# Patient Record
Sex: MALE | Race: WHITE | NOT HISPANIC OR LATINO | Employment: FULL TIME | URBAN - METROPOLITAN AREA
[De-identification: names, ages, dates, MRNs, and addresses within clinical notes are randomized per-mention and may not be internally consistent; named-entity substitution may affect disease eponyms.]

---

## 2020-10-07 ENCOUNTER — OFFICE VISIT (OUTPATIENT)
Dept: FAMILY MEDICINE | Facility: CLINIC | Age: 55
End: 2020-10-07
Payer: COMMERCIAL

## 2020-10-07 VITALS
BODY MASS INDEX: 25.02 KG/M2 | OXYGEN SATURATION: 98 % | HEIGHT: 73 IN | WEIGHT: 188.75 LBS | RESPIRATION RATE: 14 BRPM | DIASTOLIC BLOOD PRESSURE: 85 MMHG | HEART RATE: 74 BPM | TEMPERATURE: 97.2 F | SYSTOLIC BLOOD PRESSURE: 129 MMHG

## 2020-10-07 DIAGNOSIS — Z23 NEED FOR VACCINATION: ICD-10-CM

## 2020-10-07 DIAGNOSIS — M25.562 CHRONIC PAIN OF LEFT KNEE: Primary | ICD-10-CM

## 2020-10-07 DIAGNOSIS — G89.29 CHRONIC PAIN OF LEFT KNEE: Primary | ICD-10-CM

## 2020-10-07 ASSESSMENT — PAIN SCALES - GENERAL: PAINLEVEL: MILD PAIN (2)

## 2020-10-07 ASSESSMENT — MIFFLIN-ST. JEOR: SCORE: 1739.91

## 2020-10-07 NOTE — NURSING NOTE
"55 year old  Chief Complaint   Patient presents with     Knee Pain     left knee pain since April pt is a runner       Blood pressure 129/85, pulse 74, temperature 97.2  F (36.2  C), resp. rate 14, height 1.854 m (6' 0.99\"), weight 85.6 kg (188 lb 12 oz), SpO2 98 %. Body mass index is 24.91 kg/m .  There is no problem list on file for this patient.      Wt Readings from Last 2 Encounters:   10/07/20 85.6 kg (188 lb 12 oz)     BP Readings from Last 3 Encounters:   10/07/20 129/85         No current outpatient medications on file.     No current facility-administered medications for this visit.        Social History     Tobacco Use     Smoking status: Not on file   Substance Use Topics     Alcohol use: Not on file     Drug use: Not on file       Health Maintenance Due   Topic Date Due     PREVENTIVE CARE VISIT  1965     HEPATITIS C SCREENING  1965     ADVANCE CARE PLANNING  1965     COLORECTAL CANCER SCREENING  04/28/1974     HIV SCREENING  04/28/1979     DTAP/TDAP/TD IMMUNIZATION (1 - Tdap) 04/28/1989     LIPID  04/28/1999     ZOSTER IMMUNIZATION (1 of 2) 04/28/2014     PHQ-2  01/01/2020     INFLUENZA VACCINE (1) 09/01/2020       No results found for: PAP      October 7, 2020 1:51 PM  "

## 2020-10-07 NOTE — NURSING NOTE
"Injectable Influenza Immunization Documentation    1.  Has the patient received the information for the injectable influenza vaccine? YES     2. Is the patient 6 months of age or older? YES     3. Does the patient have any of the following contraindications?         Severe allergy to eggs? No     Severe allergic reaction to previous influenza vaccines? No   Severe allergy to latex? No       History of Guillain-[place] syndrome? No     Currently have a temperature greater than 100.4F? No        4.  Severely egg allergic patients should have flu vaccine eligibility assessed by an MD, RN, or pharmacist, and those who received flu vaccine should be observed for 15 min by an MD, RN, Pharmacist, Medical Technician, or member of clinic staff.\": YES    5. Latex-allergic patients should be given latex-free influenza vaccine Yes. Please reference the Vaccine latex table to determine if your clinic s product is latex-containing.       Vaccination given by EMILY Bright        "

## 2020-10-07 NOTE — PROGRESS NOTES
"Mino Kulkarni is a 55 year old male who presents to [facility] today for his first visit. Here to discuss LEFT knee pain since April 2020. Mino is a runner.  No injury. Sees Tish Dozier, PT      Had flu in 2019 and then sciatica in late 2019 affecting RIGHT side. He restarted running in March 2020 and developed pain in LEFT knee. He's able to run, but gets odd sensation in back of his knee and and then he feels swelling. Also, recently noticed some pain along Medial joint line.     Upon questioning, he recalls some aching in the LEFT knee off and on for years.     Having some swelling, sensation of instability but no tamica episodes. Stiff after sitting. No night pain.     Besides running, he enjoys cycling and walking.     May look for us to take over primary care.   Interested in receiving flu vaccine.       Review Of Systems:  Admits to have gained about 5-10 lbs since pandemic started. In fact, last year was almost 20 lbs lighter prior to a half-marathon  Has otherwise been in usual state of health, e.g.   Cardiovascular: negative  Respiratory: No shortness of breath, dyspnea on exertion, cough, or hemoptysis  Gastrointestinal: negative  Genitourinary: negative    Problem list per EMR:  There is no problem list on file for this patient.      No current outpatient medications on file.       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 Lives in [facility].       EXAM    Vitals: /85   Pulse 74   Temp 97.2  F (36.2  C)   Resp 14   Ht 1.854 m (6' 0.99\")   Wt 85.6 kg (188 lb 12 oz)   SpO2 98%   BMI 24.91 kg/m    BMI= Body mass index is 24.91 kg/m .  Physically fit and well appearing.     Gait was normal  LEFT Knee  On observation, the knee appeared with subtle, small effusion. No redness  Tenderness:  Medial joint line  Active ROM:  approx 0-125. Lacks about 5 degs in total from unaffected RIGHT side  Patellar exam: limited mobility. No " apprehension  Ligament exam:  Stable  Kerry's testing:  Equivocal.     Hips had decreased flexion with anterior tilt on L > R. Also, slightly limited RIGHT internal IROM.      ASSESSMENT/PLAN:  56 yo healthy male runner with LEFT knee pain that is likely due to DJD of medial joint.   Anterior pelvic tilt, L > R. May also have some hip DJD.     PLAN:  Discussed issue of weight and knee pain. He'd like to lose about 10 lbs so goal will be to be around 178 lbs.   Discussed supplements of glucosamine (1500 mg/day) and also new info re: Tumeric.   Also, look into other dietary issues, e.g. gluten and/or dairy.     Renewed order for P.T.   Discussed hamstring strengthening. Showed exercise with physioball.     Also, referred for X-ray at [facility].     Give flu vaccine.   Follow up for primary care.     --Ender Andrade MD  [facility], Department of Family Medicine and Community Health

## 2020-11-05 ENCOUNTER — ANCILLARY PROCEDURE (OUTPATIENT)
Dept: GENERAL RADIOLOGY | Facility: CLINIC | Age: 55
End: 2020-11-05
Attending: FAMILY MEDICINE
Payer: COMMERCIAL

## 2020-11-05 DIAGNOSIS — M25.562 CHRONIC PAIN OF LEFT KNEE: ICD-10-CM

## 2020-11-05 DIAGNOSIS — G89.29 CHRONIC PAIN OF LEFT KNEE: ICD-10-CM

## 2020-11-05 PROCEDURE — 73562 X-RAY EXAM OF KNEE 3: CPT | Mod: LT | Performed by: RADIOLOGY

## 2020-12-18 ENCOUNTER — TELEPHONE (OUTPATIENT)
Dept: ORTHOPEDICS | Facility: CLINIC | Age: 55
End: 2020-12-18

## 2020-12-18 NOTE — TELEPHONE ENCOUNTER
"12/21/20  States that he's using Tumeric and Glucosamine. Has not had PT. Feels some improvement. Has started running about 1 time per week. No set backs.     We reviewed his x-rays. He will try to increase running and let me know if things worsen.  --Ender Andrade MD    [facility] Call Center    Phone Message    May a detailed message be left on voicemail: yes     Reason for Call: Other: Patient called stating he hasn't heard back regarding the results of the knee X-ray from 11/5/20. Please follow up with patient to advise. Thank you!      Action Taken: Message routed to:  [facility]: [facility]    Travel Screening: Not Applicable                 I    I phoned Mino, but received voicemail. I don't see that he's on the \"MyChart\". I left a message requesting a call back or an appointment,  --Ender Andrade MD    3 views left knee radiographs 11/5/2020 8:25 AM     History: Chronic pain of left knee; Chronic pain of left knee     Comparison: None     Findings:     AP views of bilateral knees and lateral and patellofemoral views of  the left knee were obtained.      Left:     No acute osseous abnormality.     Enthesopathic change of the patella and tibial tuberosity with  discontinuous fragment along the patella. No associated soft tissue  swelling.     Small-to-moderate knee joint effusion.     No substantial degenerative change.     Prominent enthesopathic change of the posterior proximal tibia,  incompletely visualized.     Right:     No acute osseous abnormality.     No substantial degenerative change.                                                                      IMPRESSION:  1. No acute osseous abnormality.  2. No substantial degenerative change.  3. Enthesopathic change along the patellar tendon attachments.  4. Small-to-moderate knee joint effusion.     I have personally reviewed the examination and initial interpretation  and I agree with the findings.     KATIE " NABIL

## 2021-01-15 ENCOUNTER — HEALTH MAINTENANCE LETTER (OUTPATIENT)
Age: 56
End: 2021-01-15

## 2021-03-09 ENCOUNTER — IMMUNIZATION (OUTPATIENT)
Dept: NURSING | Facility: CLINIC | Age: 56
End: 2021-03-09
Payer: COMMERCIAL

## 2021-03-09 PROCEDURE — 0031A PR COVID VAC JANSSEN AD26 0.5ML: CPT

## 2021-03-09 PROCEDURE — 91303 PR COVID VAC JANSSEN AD26 0.5ML: CPT

## 2021-08-26 ENCOUNTER — OFFICE VISIT (OUTPATIENT)
Dept: FAMILY MEDICINE | Facility: CLINIC | Age: 56
End: 2021-08-26
Payer: COMMERCIAL

## 2021-08-26 VITALS
RESPIRATION RATE: 15 BRPM | BODY MASS INDEX: 23.39 KG/M2 | OXYGEN SATURATION: 98 % | HEIGHT: 73 IN | TEMPERATURE: 97.2 F | HEART RATE: 65 BPM | SYSTOLIC BLOOD PRESSURE: 121 MMHG | DIASTOLIC BLOOD PRESSURE: 74 MMHG | WEIGHT: 176.5 LBS

## 2021-08-26 DIAGNOSIS — N48.9 LESION OF PENIS: Primary | ICD-10-CM

## 2021-08-26 PROBLEM — E78.5 HYPERLIPIDEMIA: Status: ACTIVE | Noted: 2021-04-19

## 2021-08-26 PROBLEM — M54.2 NECK PAIN: Status: ACTIVE | Noted: 2021-08-26

## 2021-08-26 PROBLEM — M75.40 IMPINGEMENT SYNDROME OF SHOULDER REGION: Status: ACTIVE | Noted: 2021-08-26

## 2021-08-26 PROBLEM — M25.562 LEFT KNEE PAIN: Status: ACTIVE | Noted: 2021-04-28

## 2021-08-26 PROBLEM — L57.0 ACTINIC KERATOSIS: Status: ACTIVE | Noted: 2021-04-19

## 2021-08-26 PROBLEM — D12.6 ADENOMATOUS POLYP OF COLON: Status: ACTIVE | Noted: 2021-04-19

## 2021-08-26 PROBLEM — M25.519 SHOULDER PAIN: Status: ACTIVE | Noted: 2021-08-26

## 2021-08-26 PROBLEM — Z85.828 HISTORY OF BASAL CELL CARCINOMA (BCC): Status: ACTIVE | Noted: 2021-04-19

## 2021-08-26 PROCEDURE — 86695 HERPES SIMPLEX TYPE 1 TEST: CPT | Performed by: FAMILY MEDICINE

## 2021-08-26 PROCEDURE — 86696 HERPES SIMPLEX TYPE 2 TEST: CPT | Performed by: FAMILY MEDICINE

## 2021-08-26 RX ORDER — BUDESONIDE 3 MG/1
CAPSULE, COATED PELLETS ORAL
COMMUNITY
Start: 2019-09-26 | End: 2023-08-16

## 2021-08-26 RX ORDER — VALACYCLOVIR HYDROCHLORIDE 1 G/1
1000 TABLET, FILM COATED ORAL 3 TIMES DAILY
Qty: 21 TABLET | Refills: 0 | Status: SHIPPED | OUTPATIENT
Start: 2021-08-26 | End: 2021-10-11

## 2021-08-26 ASSESSMENT — MIFFLIN-ST. JEOR: SCORE: 1671.54

## 2021-08-26 NOTE — NURSING NOTE
"56 year old  Chief Complaint   Patient presents with     Penis/Scrotum Problem     bumps on penis, appeared this morning, would like testing        Blood pressure 121/74, pulse 65, temperature 97.2  F (36.2  C), temperature source Skin, resp. rate 15, height 1.842 m (6' 0.5\"), weight 80.1 kg (176 lb 8 oz), SpO2 98 %. Body mass index is 23.61 kg/m .  There is no problem list on file for this patient.      Wt Readings from Last 2 Encounters:   08/26/21 80.1 kg (176 lb 8 oz)   10/07/20 85.6 kg (188 lb 12 oz)     BP Readings from Last 3 Encounters:   08/26/21 121/74   10/07/20 129/85         Current Outpatient Medications   Medication     budesonide (ENTOCORT EC) 3 MG EC capsule     No current facility-administered medications for this visit.       Social History     Tobacco Use     Smoking status: Never Smoker     Smokeless tobacco: Never Used   Substance Use Topics     Alcohol use: None     Drug use: None       Health Maintenance Due   Topic Date Due     PREVENTIVE CARE VISIT  Never done     ADVANCE CARE PLANNING  Never done     COLORECTAL CANCER SCREENING  Never done     HIV SCREENING  Never done     HEPATITIS C SCREENING  Never done     LIPID  Never done     ZOSTER IMMUNIZATION (2 of 2) 06/16/2021     INFLUENZA VACCINE (1) 09/01/2021       No results found for: PAP      August 26, 2021 12:46 PM    "

## 2021-08-26 NOTE — PROGRESS NOTES
"SUBJECTIVE:   Mino Kulkarni is a 56 year old male who presents to clinic today to discuss the following problem(s).    Concern for HSV  - bumps on his penis starting this morning  - has had them in the past, was advised \"when this happens again, come in so we can do testing\"  - typically, symptoms will present with mild sensation of burning at the site, symptoms will last for typically 2-3 weeks and then resolve spontaneously  - patient recently tested negative for STDs per chart review  - denies fever, chills, body aches  - denies pain with urination or penile discharge     ROS: 10 point ROS neg other than the symptoms noted above in the HPI.      Today's PHQ-2:  PHQ-2 ( 1999 Pfizer) 8/26/2021   Q1: Little interest or pleasure in doing things 0   Q2: Feeling down, depressed or hopeless 0   PHQ-2 Score 0       History reviewed. No pertinent past medical history.  History reviewed. No pertinent surgical history.  History reviewed. No pertinent family history.  Social History     Tobacco Use     Smoking status: Never Smoker     Smokeless tobacco: Never Used   Substance Use Topics     Alcohol use: None     Drug use: None     Social History     Social History Narrative     Not on file       Current Outpatient Medications   Medication     budesonide (ENTOCORT EC) 3 MG EC capsule     No current facility-administered medications for this visit.     I have reviewed the patient's past medical, surgical, family, and social history.     OBJECTIVE:   /74 (BP Location: Right arm, Patient Position: Sitting, Cuff Size: Adult Regular)   Pulse 65   Temp 97.2  F (36.2  C) (Skin)   Resp 15   Ht 1.842 m (6' 0.5\")   Wt 80.1 kg (176 lb 8 oz)   SpO2 98%   BMI 23.61 kg/m      Constitutional: well-appearing, appears stated age  Eyes: conjunctivae without erythema, sclera anicteric.   Cardiac: regular rate and rhythm, normal S1/S2, no murmur/rubs/gallops  Respiratory: lungs clear to auscultation bilaterally, normal work of " breathing, no wheezes/crackles  : clump of 4-6 papular lesions on RIGHT side of shaft of penis. No current drainage or bleeding, no significant surrounding erythema. No drainage from glans.   Skin: no rashes, lesions, or wounds  Psych: affect is full and appropriate, speech is fluent and non-pressured    ASSESSMENT AND PLAN:     Mino was seen today for penis/scrotum problem.    Diagnoses and all orders for this visit:    Lesion of penis  -     Herpes Simplex Virus 1 and 2 IgG; Future  -     valACYclovir (VALTREX) 1000 mg tablet; Take 1 tablet (1,000 mg) by mouth 3 times daily for 7 days  -     Herpes Simplex Virus 1 and 2 IgG    Chief suspicion for HSV flare. Agreed to testing as noted above. Given the onset of symptoms this morning I will start valacyclovir as noted above. Will notify patient of results as available.       Alex Horne MD  [facility]  08/26/2021, 12:51 PM

## 2021-08-27 LAB
HSV1 IGG SERPL QL IA: 0.12 INDEX
HSV1 IGG SERPL QL IA: ABNORMAL
HSV2 IGG SERPL QL IA: 10.5 INDEX
HSV2 IGG SERPL QL IA: ABNORMAL

## 2021-09-05 ENCOUNTER — HEALTH MAINTENANCE LETTER (OUTPATIENT)
Age: 56
End: 2021-09-05

## 2021-10-11 ENCOUNTER — MYC REFILL (OUTPATIENT)
Dept: FAMILY MEDICINE | Facility: CLINIC | Age: 56
End: 2021-10-11

## 2021-10-11 DIAGNOSIS — N48.9 LESION OF PENIS: ICD-10-CM

## 2021-10-11 RX ORDER — VALACYCLOVIR HYDROCHLORIDE 1 G/1
1000 TABLET, FILM COATED ORAL 3 TIMES DAILY
Qty: 21 TABLET | Refills: 0 | Status: SHIPPED | OUTPATIENT
Start: 2021-10-11 | End: 2021-11-23

## 2021-10-11 NOTE — TELEPHONE ENCOUNTER
MyChart correspondence. Med refilled as requested.     Mino was seen today for refill request.    Diagnoses and all orders for this visit:    Lesion of penis  -     valACYclovir (VALTREX) 1000 mg tablet; Take 1 tablet (1,000 mg) by mouth 3 times daily    Alex Horne MD  1:02 PM, October 11, 2021

## 2021-11-23 ENCOUNTER — MYC REFILL (OUTPATIENT)
Dept: FAMILY MEDICINE | Facility: CLINIC | Age: 56
End: 2021-11-23
Payer: COMMERCIAL

## 2021-11-23 DIAGNOSIS — N48.9 LESION OF PENIS: ICD-10-CM

## 2021-11-23 DIAGNOSIS — B00.9 HSV (HERPES SIMPLEX VIRUS) INFECTION: Primary | ICD-10-CM

## 2021-11-23 RX ORDER — VALACYCLOVIR HYDROCHLORIDE 1 G/1
1000 TABLET, FILM COATED ORAL 3 TIMES DAILY
Qty: 21 TABLET | Refills: 0 | Status: SHIPPED | OUTPATIENT
Start: 2021-11-23 | End: 2022-01-31

## 2021-11-23 NOTE — TELEPHONE ENCOUNTER
[facility]     Mino was seen today for refill request.    Diagnoses and all orders for this visit:    HSV (herpes simplex virus) infection  -     valACYclovir (VALTREX) 1000 mg tablet; Take 1 tablet (1,000 mg) by mouth 3 times daily    Lesion of penis  -     valACYclovir (VALTREX) 1000 mg tablet; Take 1 tablet (1,000 mg) by mouth 3 times daily        Alex Horne MD  5:08 PM, November 23, 2021

## 2022-01-31 ENCOUNTER — MYC REFILL (OUTPATIENT)
Dept: FAMILY MEDICINE | Facility: CLINIC | Age: 57
End: 2022-01-31
Payer: COMMERCIAL

## 2022-01-31 DIAGNOSIS — N48.9 LESION OF PENIS: ICD-10-CM

## 2022-01-31 DIAGNOSIS — B00.9 HSV (HERPES SIMPLEX VIRUS) INFECTION: ICD-10-CM

## 2022-02-02 RX ORDER — VALACYCLOVIR HYDROCHLORIDE 1 G/1
1000 TABLET, FILM COATED ORAL 3 TIMES DAILY
Qty: 21 TABLET | Refills: 0 | Status: SHIPPED | OUTPATIENT
Start: 2022-02-02 | End: 2022-03-11

## 2022-02-02 NOTE — TELEPHONE ENCOUNTER
Valacyclovir (Valtrex) 1000 mg    Last Office Visit: 8/26/21  Future [facility] Appointments: None  Medication last refilled: 11/23/21 #21 with 0 refill(s)    Prescription approved per [facility] Refill Protocol.    Disha العلي RN, BSN

## 2022-02-20 ENCOUNTER — HEALTH MAINTENANCE LETTER (OUTPATIENT)
Age: 57
End: 2022-02-20

## 2022-03-11 ENCOUNTER — MYC REFILL (OUTPATIENT)
Dept: FAMILY MEDICINE | Facility: CLINIC | Age: 57
End: 2022-03-11
Payer: COMMERCIAL

## 2022-03-11 DIAGNOSIS — N48.9 LESION OF PENIS: ICD-10-CM

## 2022-03-11 DIAGNOSIS — B00.9 HSV (HERPES SIMPLEX VIRUS) INFECTION: ICD-10-CM

## 2022-03-11 RX ORDER — VALACYCLOVIR HYDROCHLORIDE 1 G/1
1000 TABLET, FILM COATED ORAL 3 TIMES DAILY
Qty: 21 TABLET | Refills: 0 | Status: SHIPPED | OUTPATIENT
Start: 2022-03-11 | End: 2022-04-29

## 2022-03-11 NOTE — TELEPHONE ENCOUNTER
Medication requested: valACYclovir (VALTREX) 1000 mg tablet  Last office visit: 8/26/21  [facility] appointments: none  Medication last refilled: 2/2/22; 21 tab + 0 refills    Prescription approved per [facility] Refill Protocol.    Tobin VELASQUEZ, RN  03/11/22 4:01 PM

## 2022-04-29 ENCOUNTER — MYC REFILL (OUTPATIENT)
Dept: FAMILY MEDICINE | Facility: CLINIC | Age: 57
End: 2022-04-29
Payer: COMMERCIAL

## 2022-04-29 DIAGNOSIS — B00.9 HSV (HERPES SIMPLEX VIRUS) INFECTION: ICD-10-CM

## 2022-04-29 DIAGNOSIS — N48.9 LESION OF PENIS: ICD-10-CM

## 2022-04-29 RX ORDER — VALACYCLOVIR HYDROCHLORIDE 1 G/1
1000 TABLET, FILM COATED ORAL 3 TIMES DAILY
Qty: 21 TABLET | Refills: 0 | Status: SHIPPED | OUTPATIENT
Start: 2022-04-29 | End: 2022-06-23

## 2022-04-29 NOTE — TELEPHONE ENCOUNTER
Valacyclovir (Valtrex) 1000 mg    Last Office Visit: 8/26/21  Future [facility] Appointments: None  Medication last refilled: 3/11/22 #21 with 0 refill(s)    Required labs per protocol:    LAB REF RANGE 4/24/19 4/19/21   Creatinine 0.74-1.35 mg/dL 1.26 1.07     Prescription approved per [facility] Refill Protocol.    JUAN RAMON CouchN, RN, CCM

## 2022-06-23 ENCOUNTER — MYC REFILL (OUTPATIENT)
Dept: FAMILY MEDICINE | Facility: CLINIC | Age: 57
End: 2022-06-23

## 2022-06-23 DIAGNOSIS — N48.9 LESION OF PENIS: ICD-10-CM

## 2022-06-23 DIAGNOSIS — B00.9 HSV (HERPES SIMPLEX VIRUS) INFECTION: ICD-10-CM

## 2022-06-24 RX ORDER — VALACYCLOVIR HYDROCHLORIDE 1 G/1
1000 TABLET, FILM COATED ORAL 3 TIMES DAILY
Qty: 21 TABLET | Refills: 0 | Status: SHIPPED | OUTPATIENT
Start: 2022-06-24 | End: 2022-08-17

## 2022-06-24 NOTE — TELEPHONE ENCOUNTER
Valacyclovir (Valtrex) 1000 mg    Last Office Visit: 8/26/21  [facility] Appointments: None  Medication last refilled: 4/29/22 #21 with 0 refill(s)    Required labs per protocol:    LAB REF RANGE 4/24/19 4/19/21   Creatinine 0.8-1.25 mg/dL 1.26 1.07     Medication is being filled for 1 time refill only due to:  Patient needs labs Creatinine. Patient needs to be seen because due for year follow up in August.     [employer] message sent to patient requesting he call and schedule appointment.    Disha العلي, JUAN RAMONN, RN, CCM

## 2022-08-16 NOTE — PROGRESS NOTES
Assessment & Plan   Problem List Items Addressed This Visit    None     Visit Diagnoses     HSV (herpes simplex virus) infection    -  Primary    Relevant Medications    valACYclovir (VALTREX) 500 MG tablet    Other Relevant Orders    Basic metabolic panel         Changed Rx to reflect HSV recurrent flare dosinmg BID for three days. Discussed possible daily prophylactic dosing but patient defers this for now. Labs as ordered.     24 minutes spent on the date of the encounter doing chart review, history and exam, documentation and further activities as noted.    Alex Horne MD  [facility]    Subjective   Mino is a 57 year old presenting for the following health issues:  Recheck Medication (Valtrex labs)      HPI   HSV  - multiple refills of valacyclovir over the past year  - due for clinic visit and BMP to assess kidney function for any future Valtrex refills  - per chart, patient has been taking 1g TID for 7 days with each outbreak  - not interested is transitioning to daily prophylaxis at this time  - no concern for medication side effects    Review of Systems   Constitutional, HEENT, cardiovascular, pulmonary, gi and gu systems are negative, except as otherwise noted.      Objective    /80 (BP Location: Right arm, Patient Position: Sitting, Cuff Size: Adult Regular)   Pulse 76   Temp 97.9  F (36.6  C) (Skin)   Resp 12   Wt 82.8 kg (182 lb 8 oz)   SpO2 96%   BMI 24.41 kg/m    Body mass index is 24.41 kg/m .  Physical Exam   GENERAL: healthy, alert and no distress  NECK: no adenopathy, no asymmetry, masses, or scars and thyroid normal to palpation  RESP: lungs clear to auscultation - no rales, rhonchi or wheezes  CV: regular rate and rhythm, normal S1 S2, no S3 or S4, no murmur, click or rub, no peripheral edema and peripheral pulses strong  ABDOMEN: soft, nontender, no hepatosplenomegaly, no masses and bowel sounds normal  MS: no gross musculoskeletal defects noted, no  edema          .  ..

## 2022-08-17 ENCOUNTER — OFFICE VISIT (OUTPATIENT)
Dept: FAMILY MEDICINE | Facility: CLINIC | Age: 57
End: 2022-08-17
Payer: COMMERCIAL

## 2022-08-17 VITALS
HEART RATE: 76 BPM | OXYGEN SATURATION: 96 % | BODY MASS INDEX: 24.41 KG/M2 | TEMPERATURE: 97.9 F | SYSTOLIC BLOOD PRESSURE: 128 MMHG | RESPIRATION RATE: 12 BRPM | WEIGHT: 182.5 LBS | DIASTOLIC BLOOD PRESSURE: 80 MMHG

## 2022-08-17 DIAGNOSIS — B00.9 HSV (HERPES SIMPLEX VIRUS) INFECTION: Primary | ICD-10-CM

## 2022-08-17 PROBLEM — S83.249A TEAR OF MEDIAL MENISCUS OF KNEE: Status: ACTIVE | Noted: 2021-11-24

## 2022-08-17 PROBLEM — M17.9 OSTEOARTHRITIS OF KNEE: Status: ACTIVE | Noted: 2021-11-24

## 2022-08-17 LAB
ANION GAP SERPL CALCULATED.3IONS-SCNC: 11 MMOL/L (ref 7–15)
BUN SERPL-MCNC: 19.5 MG/DL (ref 6–20)
CALCIUM SERPL-MCNC: 9.9 MG/DL (ref 8.6–10)
CHLORIDE SERPL-SCNC: 101 MMOL/L (ref 98–107)
CREAT SERPL-MCNC: 1.1 MG/DL (ref 0.67–1.17)
DEPRECATED HCO3 PLAS-SCNC: 26 MMOL/L (ref 22–29)
GFR SERPL CREATININE-BSD FRML MDRD: 78 ML/MIN/1.73M2
GLUCOSE SERPL-MCNC: 93 MG/DL (ref 70–99)
POTASSIUM SERPL-SCNC: 4 MMOL/L (ref 3.4–5.3)
SODIUM SERPL-SCNC: 138 MMOL/L (ref 136–145)

## 2022-08-17 PROCEDURE — 80048 BASIC METABOLIC PNL TOTAL CA: CPT | Performed by: FAMILY MEDICINE

## 2022-08-17 RX ORDER — VALACYCLOVIR HYDROCHLORIDE 500 MG/1
500 TABLET, FILM COATED ORAL 2 TIMES DAILY
Qty: 6 TABLET | Refills: 4 | Status: SHIPPED | OUTPATIENT
Start: 2022-08-17 | End: 2023-04-04

## 2022-08-17 NOTE — NURSING NOTE
57 year old  Chief Complaint   Patient presents with     Recheck Medication     Valtrex labs       Blood pressure 128/80, pulse 76, temperature 97.9  F (36.6  C), temperature source Skin, resp. rate 12, weight 82.8 kg (182 lb 8 oz), SpO2 96 %. Body mass index is 24.41 kg/m .  Patient Active Problem List   Diagnosis     Actinic keratosis     Adenomatous polyp of colon     Collagenous colitis     History of basal cell carcinoma (BCC)     Hyperlipidemia     Impingement syndrome of shoulder region     Left knee pain     Migraine variant     Neck pain     Shoulder pain       Wt Readings from Last 2 Encounters:   08/17/22 82.8 kg (182 lb 8 oz)   08/26/21 80.1 kg (176 lb 8 oz)     BP Readings from Last 3 Encounters:   08/17/22 128/80   08/26/21 121/74   10/07/20 129/85         Current Outpatient Medications   Medication     budesonide (ENTOCORT EC) 3 MG EC capsule     valACYclovir (VALTREX) 1000 mg tablet     No current facility-administered medications for this visit.       Social History     Tobacco Use     Smoking status: Never Smoker     Smokeless tobacco: Never Used       Health Maintenance Due   Topic Date Due     PREVENTIVE CARE VISIT  Never done     ADVANCE CARE PLANNING  Never done     COLORECTAL CANCER SCREENING  Never done     HIV SCREENING  Never done     HEPATITIS C SCREENING  Never done     LIPID  Never done       No results found for: PAP      August 17, 2022 9:30 AM

## 2022-10-23 ENCOUNTER — HEALTH MAINTENANCE LETTER (OUTPATIENT)
Age: 57
End: 2022-10-23

## 2023-04-02 ENCOUNTER — HEALTH MAINTENANCE LETTER (OUTPATIENT)
Age: 58
End: 2023-04-02

## 2023-04-03 DIAGNOSIS — B00.9 HSV (HERPES SIMPLEX VIRUS) INFECTION: ICD-10-CM

## 2023-04-04 RX ORDER — VALACYCLOVIR HYDROCHLORIDE 500 MG/1
500 TABLET, FILM COATED ORAL 2 TIMES DAILY
Qty: 6 TABLET | Refills: 4 | Status: SHIPPED | OUTPATIENT
Start: 2023-04-04 | End: 2023-08-16

## 2023-04-04 NOTE — TELEPHONE ENCOUNTER
Medication requested: valACYclovir (VALTREX) 500 MG tablet  Last office visit: 8/17/22  [facility] appointments: none  Medication last refilled: 8/17/22; 6 + 4 refills  Last qualifying labs:   Component      Latest Ref Rng 8/17/2022   Creatinine      0.67 - 1.17 mg/dL 1.10      Prescription approved per [facility] Refill Protocol.    Tobin VELASQUEZ, RN  04/04/23 2:10 PM

## 2023-07-07 ENCOUNTER — OFFICE VISIT (OUTPATIENT)
Dept: URGENT CARE | Facility: URGENT CARE | Age: 58
End: 2023-07-07
Payer: COMMERCIAL

## 2023-07-07 VITALS
TEMPERATURE: 97.9 F | HEART RATE: 67 BPM | SYSTOLIC BLOOD PRESSURE: 140 MMHG | OXYGEN SATURATION: 99 % | DIASTOLIC BLOOD PRESSURE: 88 MMHG

## 2023-07-07 DIAGNOSIS — M54.41 CHRONIC RIGHT-SIDED LOW BACK PAIN WITH RIGHT-SIDED SCIATICA: Primary | ICD-10-CM

## 2023-07-07 DIAGNOSIS — G89.29 CHRONIC RIGHT-SIDED LOW BACK PAIN WITH RIGHT-SIDED SCIATICA: Primary | ICD-10-CM

## 2023-07-07 PROCEDURE — 99203 OFFICE O/P NEW LOW 30 MIN: CPT | Performed by: EMERGENCY MEDICINE

## 2023-07-07 RX ORDER — METHYLPREDNISOLONE 4 MG
TABLET, DOSE PACK ORAL
Qty: 21 TABLET | Refills: 0 | Status: SHIPPED | OUTPATIENT
Start: 2023-07-07 | End: 2023-08-16

## 2023-07-07 NOTE — PROGRESS NOTES
"  Assessment & Plan   Problem List Items Addressed This Visit    None  Visit Diagnoses     Hip pain, right    -  Primary    Relevant Orders    XR Hip Right 2-3 Views    Right-sided low back pain with right-sided sciatica, unspecified chronicity        Relevant Orders    MR Lumbar Spine w/o & w Contrast    Spine  Referral         Concern for recurrent injury at site of previous surgery. Imaging as ordered above. Would not expect significant changes with low back XR at this point as this presents as most likely nerve impingement. Referral to spine clinic for consideration of further treatment options as indicated.     26 minutes spent on the date of the encounter doing chart review, history and exam, documentation and further activities as noted.      Alex Horne MD  [facility]    Subjective   Mino is a 58 year old, presenting for the following health issues:  Back Pain (Pt noticed their sciatica sx worsening in April. They had gone to PT at [place]. During the 4th of July weekend, Pt reports dramatic change and worsening of back pain. They visited Urgent Care and received a prednisone dose pack for the pain. )    HPI   \"discuss sciatica\"  U/C note of 7/7/23  Medical Decision Making:  Mino Kulkarni is a 58 year old male who presents for evaluation of back pain that started after running last week and being in a rental car with a bad seat.  He has a history of sciatica in the same leg in the past. The patient did not sustain any trauma, therefore x-rays are not necessary due to the low likelihood of fracture or subluxation.    No red flag symptoms to suggest ER evaluation with CT and/or MRI is indicated at this point.  There is no clinical evidence or history concerning for cauda equina syndrome, discitis, spinal/epidural space hematoma or epidural abscess or other worrisome etiology. The neurological exam is normal and the patient's symptoms seem consistent with musculoskeletal issues and " "sciatica. No focal neurologic deficits here and he is ambulatory.  The patient will be discharged with pain medications (Medrol dosepak) to use as directed. Ice or heat to the back and stretching exercises. No heavy lifting, bending or twisting. Go to the ER if increasing pain, numbness, weakness, or bowel or bladder dysfunction. Patient was advised to schedule follow-up with their primary doctor within 3 days to re-assess symptoms. Questions answered.  Today  - previous history notable for lumbar discectomy at L5-S1 back in 2003.   - currently reports the steroid is helping \"some\" but symptoms persist  - visible muscle atrophy at RIGHT calf, Mino says this is from before the surgery in 2003    RIGHT hip pain  - fell on his hip some years ago and has noticed an \"indentation\" since then  - no pain or weakness  - has always wondered if this might have something to do with his low back and leg symptoms      Review of Systems   Constitutional, HEENT, cardiovascular, pulmonary, gi and gu systems are negative, except as otherwise noted.      Objective    /87 (BP Location: Right arm, Patient Position: Sitting, Cuff Size: Adult Regular)   Pulse 63   Temp 97.9  F (36.6  C) (Skin)   Resp 15   Ht 1.842 m (6' 0.5\")   Wt 82.6 kg (182 lb)   SpO2 97%   BMI 24.34 kg/m    Body mass index is 24.34 kg/m .  Physical Exam   GENERAL: healthy, alert and no distress  NECK: no adenopathy, no asymmetry, masses, or scars and thyroid normal to palpation  RESP: lungs clear to auscultation - no rales, rhonchi or wheezes  CV: regular rate and rhythm, normal S1 S2, no S3 or S4, no murmur, click or rub, no peripheral edema and peripheral pulses strong  ABDOMEN: soft, nontender, no hepatosplenomegaly, no masses and bowel sounds normal  MS: Lower back  - visible muscle atrophy of RIGHT calf as mentioned above  - no pain to palpation at back  - strength grossly normal bilaterally  - sensation grossly intact          "

## 2023-07-07 NOTE — PROGRESS NOTES
Assessment & Plan     Diagnosis:    ICD-10-CM    1. Chronic right-sided low back pain with right-sided sciatica  M54.41 methylPREDNISolone (MEDROL DOSEPAK) 4 MG tablet therapy pack    G89.29               Medical Decision Making:  Mino Kulkarni is a 58 year old male who presents for evaluation of back pain that started after running last week and being in a rental car with a bad seat.  He has a history of sciatica in the same leg in the past. The patient did not sustain any trauma, therefore x-rays are not necessary due to the low likelihood of fracture or subluxation.      No red flag symptoms to suggest ER evaluation with CT and/or MRI is indicated at this point.  There is no clinical evidence or history concerning for cauda equina syndrome, discitis, spinal/epidural space hematoma or epidural abscess or other worrisome etiology. The neurological exam is normal and the patient's symptoms seem consistent with musculoskeletal issues and sciatica. No focal neurologic deficits here and he is ambulatory.    The patient will be discharged with pain medications to use as directed. Ice or heat to the back and stretching exercises. No heavy lifting, bending or twisting. Go to the ER if increasing pain, numbness, weakness, or bowel or bladder dysfunction. Patient was advised to schedule follow-up with their primary doctor within 3 days to re-assess symptoms. Questions answered.    Lopez Rice PA-C  [facility]    Subjective     Mino Kulkarni is a 58 year old male who presents to clinic today for the following health issues:  Chief Complaint   Patient presents with     Urgent Care     Back Pain     Right lower back/ leg   Pain started a week ago after running        HPI  Patient present with back pain that started in April-- hx of sciatica; worsening over the last week with increased running/exercising and being in an uncomfortable rental car. Pain is described as throbbing and is in the right lower  back; does radiate down the right leg consistent with his prior hx of sciatica. Patient has been walking; notes pain is worse with transitioning from sitting to standing and better with laying down. No numbness or weakness, bowel or bladder incontinence, recent trauma, fever, night sweats, abdominal pain or other concerns. Patient has history of spine/back surgery at L5-S1.       Review of Systems    See HPI    Objective      Vitals: BP (!) 140/88   Pulse 67   Temp 97.9  F (36.6  C) (Temporal)   SpO2 99%       Patient Vitals for the past 24 hrs:   BP Temp Temp src Pulse SpO2   07/07/23 1517 (!) 140/88 97.9  F (36.6  C) Temporal 67 99 %       Vital signs reviewed by: Lopez Rice PA-C    Physical Exam   Constitutional: Alert and active. No acute distress.   Head: Atraumatic. Normocephalic  GI: Abdomen is soft and non-tender throughout. No CVA tenderness bilaterally.  Musculoskeletal: Tenderness to palpation in the right lumbosacral paraspinal musculature. No midline T or L spine tenderness to palpation. Normal range of motion of the lower extremties.  Neurological: Alert and oriented x3. Strength and sensation is intact and symmetric in the bilateral lower extremities.  Skin: No rash noted on visualized skin on back.       Lopez Rice PA-C, July 7, 2023

## 2023-07-10 ENCOUNTER — OFFICE VISIT (OUTPATIENT)
Dept: FAMILY MEDICINE | Facility: CLINIC | Age: 58
End: 2023-07-10
Payer: COMMERCIAL

## 2023-07-10 VITALS
WEIGHT: 182 LBS | RESPIRATION RATE: 15 BRPM | OXYGEN SATURATION: 97 % | HEIGHT: 73 IN | BODY MASS INDEX: 24.12 KG/M2 | DIASTOLIC BLOOD PRESSURE: 87 MMHG | HEART RATE: 63 BPM | TEMPERATURE: 97.9 F | SYSTOLIC BLOOD PRESSURE: 137 MMHG

## 2023-07-10 DIAGNOSIS — M54.41 RIGHT-SIDED LOW BACK PAIN WITH RIGHT-SIDED SCIATICA, UNSPECIFIED CHRONICITY: ICD-10-CM

## 2023-07-10 DIAGNOSIS — M25.551 HIP PAIN, RIGHT: Primary | ICD-10-CM

## 2023-07-10 RX ORDER — COLESEVELAM 180 1/1
TABLET ORAL
COMMUNITY
Start: 2023-06-30

## 2023-07-10 NOTE — NURSING NOTE
"58 year old  Chief Complaint   Patient presents with     Back Pain     Pt noticed their sciatica sx worsening in April. They had gone to PT at [place]. During the 4th of July weekend, Pt reports dramatic change and worsening of back pain. They visited Urgent Care and received a prednisone dose pack for the pain.        Blood pressure 137/87, pulse 63, temperature 97.9  F (36.6  C), temperature source Skin, resp. rate 15, height 1.842 m (6' 0.5\"), weight 82.6 kg (182 lb), SpO2 97 %. Body mass index is 24.34 kg/m .  Patient Active Problem List   Diagnosis     Actinic keratosis     Adenomatous polyp of colon     Collagenous colitis     History of basal cell carcinoma (BCC)     Hyperlipidemia     Impingement syndrome of shoulder region     Migraine variant     Neck pain     Shoulder pain     Osteoarthritis of knee     Tear of medial meniscus of knee       Wt Readings from Last 2 Encounters:   07/10/23 82.6 kg (182 lb)   08/17/22 82.8 kg (182 lb 8 oz)     BP Readings from Last 3 Encounters:   07/10/23 137/87   07/07/23 (!) 140/88   08/17/22 128/80         Current Outpatient Medications   Medication     colesevelam (WELCHOL) 625 MG tablet     methylPREDNISolone (MEDROL DOSEPAK) 4 MG tablet therapy pack     valACYclovir (VALTREX) 500 MG tablet     budesonide (ENTOCORT EC) 3 MG EC capsule     No current facility-administered medications for this visit.       Social History     Tobacco Use     Smoking status: Never     Smokeless tobacco: Never   Vaping Use     Vaping Use: Never used       Health Maintenance Due   Topic Date Due     YEARLY PREVENTIVE VISIT  Never done     ADVANCE CARE PLANNING  Never done     HEPATITIS B IMMUNIZATION (1 of 3 - 3-dose series) Never done     HIV SCREENING  Never done     HEPATITIS C SCREENING  Never done     LIPID  Never done       No results found for: PAP      July 10, 2023 9:50 AM    "

## 2023-07-13 ENCOUNTER — MYC MEDICAL ADVICE (OUTPATIENT)
Dept: FAMILY MEDICINE | Facility: CLINIC | Age: 58
End: 2023-07-13

## 2023-07-13 DIAGNOSIS — M54.41 RIGHT-SIDED LOW BACK PAIN WITH RIGHT-SIDED SCIATICA, UNSPECIFIED CHRONICITY: Primary | ICD-10-CM

## 2023-07-13 RX ORDER — PREDNISONE 10 MG/1
TABLET ORAL
Qty: 54 TABLET | Refills: 0 | Status: SHIPPED | OUTPATIENT
Start: 2023-07-13 | End: 2023-08-03

## 2023-07-13 NOTE — TELEPHONE ENCOUNTER
Repeat steroid dose with prolonged taper as noted below.    Diagnoses and all orders for this visit:    Right-sided low back pain with right-sided sciatica, unspecified chronicity  -     predniSONE (DELTASONE) 10 MG tablet; Take 4 tablets (40 mg) by mouth daily for 7 days, THEN 3 tablets (30 mg) daily for 5 days, THEN 2 tablets (20 mg) daily for 3 days, THEN 1 tablet (10 mg) daily for 3 days, THEN 0.5 tablets (5 mg) daily for 3 days.      Alex Horne MD  4:01 PM, July 13, 2023

## 2023-07-14 ENCOUNTER — ANCILLARY PROCEDURE (OUTPATIENT)
Dept: MRI IMAGING | Facility: CLINIC | Age: 58
End: 2023-07-14
Attending: FAMILY MEDICINE
Payer: COMMERCIAL

## 2023-07-14 DIAGNOSIS — M54.41 RIGHT-SIDED LOW BACK PAIN WITH RIGHT-SIDED SCIATICA, UNSPECIFIED CHRONICITY: ICD-10-CM

## 2023-07-14 PROCEDURE — 72148 MRI LUMBAR SPINE W/O DYE: CPT | Mod: GC | Performed by: STUDENT IN AN ORGANIZED HEALTH CARE EDUCATION/TRAINING PROGRAM

## 2023-08-13 ASSESSMENT — ENCOUNTER SYMPTOMS
DECREASED APPETITE: 0
ARTHRALGIAS: 0
SPEECH CHANGE: 0
NECK PAIN: 0
NUMBNESS: 1
STIFFNESS: 1
SKIN CHANGES: 0
INCREASED ENERGY: 0
SEIZURES: 0
NIGHT SWEATS: 0
JOINT SWELLING: 0
MUSCLE WEAKNESS: 1
TINGLING: 1
MYALGIAS: 0
WEIGHT GAIN: 0
PARALYSIS: 0
WEIGHT LOSS: 0
WEAKNESS: 1
MUSCLE CRAMPS: 0
DISTURBANCES IN COORDINATION: 0
BACK PAIN: 1
FATIGUE: 0
HALLUCINATIONS: 0
POOR WOUND HEALING: 0
NAIL CHANGES: 0
POLYDIPSIA: 0
MEMORY LOSS: 0
CHILLS: 0
TREMORS: 0
LOSS OF CONSCIOUSNESS: 0
FEVER: 0
HEADACHES: 0
ALTERED TEMPERATURE REGULATION: 0
DIZZINESS: 0
POLYPHAGIA: 0

## 2023-08-14 ENCOUNTER — OFFICE VISIT (OUTPATIENT)
Dept: ANESTHESIOLOGY | Facility: CLINIC | Age: 58
End: 2023-08-14
Attending: FAMILY MEDICINE
Payer: COMMERCIAL

## 2023-08-14 ENCOUNTER — MYC MEDICAL ADVICE (OUTPATIENT)
Dept: FAMILY MEDICINE | Facility: CLINIC | Age: 58
End: 2023-08-14

## 2023-08-14 VITALS
DIASTOLIC BLOOD PRESSURE: 86 MMHG | OXYGEN SATURATION: 97 % | WEIGHT: 182 LBS | HEIGHT: 72 IN | SYSTOLIC BLOOD PRESSURE: 126 MMHG | HEART RATE: 69 BPM | BODY MASS INDEX: 24.65 KG/M2

## 2023-08-14 DIAGNOSIS — M54.41 RIGHT-SIDED LOW BACK PAIN WITH RIGHT-SIDED SCIATICA, UNSPECIFIED CHRONICITY: ICD-10-CM

## 2023-08-14 DIAGNOSIS — M54.41 RIGHT-SIDED LOW BACK PAIN WITH RIGHT-SIDED SCIATICA, UNSPECIFIED CHRONICITY: Primary | ICD-10-CM

## 2023-08-14 DIAGNOSIS — M25.551 HIP PAIN, RIGHT: ICD-10-CM

## 2023-08-14 PROCEDURE — 99204 OFFICE O/P NEW MOD 45 MIN: CPT | Performed by: ANESTHESIOLOGY

## 2023-08-14 ASSESSMENT — ENCOUNTER SYMPTOMS
MEMORY LOSS: 0
LOSS OF CONSCIOUSNESS: 0
HEADACHES: 0
FEVER: 0
POLYDIPSIA: 0
DIZZINESS: 0
CHILLS: 0
WEAKNESS: 1
NECK PAIN: 0
MYALGIAS: 0
WEIGHT LOSS: 0
TREMORS: 0
SEIZURES: 0
BACK PAIN: 1
SPEECH CHANGE: 0
HALLUCINATIONS: 0
TINGLING: 1

## 2023-08-14 ASSESSMENT — PAIN SCALES - GENERAL: PAINLEVEL: SEVERE PAIN (6)

## 2023-08-14 NOTE — NURSING NOTE
RN read through the instructions with the patient for the recommended procedure: Lumbar Epidural Steroid Injection  Patient verbalized understanding to holding appropriate medication per protocol and was agreeable to NPO policy and needing a .    Anticoagulant: None reported.    Recommended Follow Up: Follow up in 2 months.    Yanique Fischer RNCC

## 2023-08-14 NOTE — NURSING NOTE
Patient presents with:  Consult: Consult Lower back right side pain      Severe Pain (6)         What medications are you using for pain? Ibuprofen    (New patients only) Have you been seen by another pain clinic/ provider? yes    (Return Patients only) What refills are you needing today? No    Expectation Not Sure

## 2023-08-14 NOTE — PATIENT INSTRUCTIONS
Procedures:    Call to schedule your procedure: [phone] option #2  Lumbar Epidural Steroid Injection    Your pre-procedure instructions are below, please call our clinic if you have any questions.      Treatment planning:    Recommendations will be written in the providers note for your Primary Care provider (OR other providers in your care team) to review and make changes to your therapies based on their discretion.       Recommended Follow up:      Follow up in 2 months.    To speak with a nurse, schedule/reschedule/cancel a clinic appointment, or request a medication refill call: [phone].    You can also reach us by [email or web address]: [email or web address]      Procedure Information related to COVID-19     Please call [phone] option #2 to schedule, reschedule, or cancel your procedure appointment.   Phones are answered Monday - Friday from 08:00 - 4:30pm.  Leave a voicemail with your name, birth date, and phone number if no one is available to take your call.        You no longer need to test for COVID- 19 prior to your procedure/surgery, unless your physician specifically requests that you test. If you experience COVID symptoms or have tested positive for COVID-19 within 14 days of your scheduled surgery or procedure, please update our office right away and your procedure may have to be postponed.       The procedure center staff will call you several days before the procedure to review important information that you will need to know for the day of the procedure.     Please contact the clinic if you have further questions about this information [phone].        Information related to Scheduling and Pre-Procedure Instructions:    If you must reschedule your procedure more than two times, you must follow up in clinic before rescheduling again.      Preparing for your procedure    CAUTION - FAILURE TO FOLLOW THESE PRE-PROCEDURE INSTRUCTIONS WILL RESULT IN YOUR PROCEDURE BEING  RESCHEDULED.    Your Procedure: Lumbar Epidural Steroid Injection        You must have a  take you home after your procedure. Transportation by taxi or para-transit is okay as long as you have a responsible adult accompany you. You must provide your 's full name and contact number at time of check in.     Fasting Protocol Please have nothing to eat or drink 1 hour prior to arrival.     Medications If you take any medications, DO NOT STOP. Take your medications as usual the day of your procedure with a sip of water AT LEAST 2 HOURS PRIOR TO ARRIVAL.    Antibiotics If you are currently taking antibiotics, you must complete the entire dose 7 days prior to your scheduled procedure. You must be clear of any signs or symptoms of infection. If you begin antibiotics, please contact our clinic for instructions.     Fever, Chills, or Rash If you experience a fever of higher than 100 degrees, chills, rash, or open wounds during the one week before your procedure, please call the clinic to see if you may proceed with your procedure.      Medication Hold List  **Patients under Cardiology/Neurology care should consult their provider prior to the pain procedure to verify pre-procedure medication instructions. The information below contains general guidelines.**      Blood Thinners If you are taking daily ASPIRIN, PLAVIX, OR OTHER BLOOD THINNERS SUCH AS COUMADIN/WARFARIN, we will need your prescribing doctor to sign a release permitting you to stop these medications. Once approved by your prescribing doctor - STOP ALL BLOOD THINNERS BASED ON THE TIME TABLE BELOW PRIOR TO YOUR PROCEDURE. If you have been instructed to stop WARFARIN(COUMADIN), you must have an INR lab drawn the day before your procedure. Your INR must be within normal limits before we can perform your injection. MEDICATIONS CAN BE RESTARTED AFTER YOUR PROCEDURE.    24 HOUR HOLD  Lovenox (enoxaparin)  Agrylin (Anagrelide)    3 DAY HOLD  Xarelto  (rivaroxaban)    5 DAY HOLD  Coumadin (Warfarin)  Brilinta (ticagrelor) 7 DAY HOLD  Anacin, Bufferin, Ecotrin, Excedrin, Aggrenox (Aspirin)  Pradexa (Dabigatran)  Elmiron (Pentosan)  Plavix (Clopidogrel Bisulfate)  Pletal (Cilostazol)    10 DAY HOLD  Effient (Prasugel)    14 DAY HOLD  Ticlid (ticlopidine)        Non-steroidal Anti-inflammatories (NSAIDs) DO NOT TAKE any non-steroidal anti-inflammatory medications (NSAIDs) listed on the table below. MEDICATIONS CAN BE RESTARTED AFTER YOUR PROCEDURE. Celebrex is OK to take and does not need to be discontinued.     Medications to stop:  1 DAY HOLD  Advil, Motrin (Ibuprofen)  Voltaren (Diclofenac)  Toradol (Ketorolac)    3 DAY HOLD  Arthrotec (diciofenac sodium/misoprostol)  Clinoril (Sulindac)  Indocin (Indomethacin)  Lodine (Etodolac)  Vicoprofen (Hydrocodone and Ibuprofen)  Apixaban (Eliquis)    4 DAY HOLD  Mobic (Meloxicam)  Naprosyn (Naproxen)   7 DAY HOLD  Aleve (Naproxen sodium)  Darvon compound (contains aspirin)  Norgesic Forte (contains aspirin)  Oruvall (Ketoprofen)  Percodan (contains aspirin)  Relafen (Nabumetone)  Salsalate  Trilisate  Vitamin E (more than 400 mg per day)  Any medication containing aspirin    14 DAY HOLD  Daypro (Oxaprozin)  Feldene (Piroxicam)            To speak with a nurse, schedule/reschedule/cancel a clinic appointment, or request a medication refill call: [phone]    You can also reach us by [employer]: [email or web address]

## 2023-08-14 NOTE — LETTER
8/14/2023       RE: Mino Kulkarni  [address] [place] [id number]       Dear Colleague,    Thank you for referring your patient, Mino Kulkarni, to the [facility] FOR COM[facility]HENSIVE PAIN [facility]NAGEMENT [facility]. Please see a copy of my visit note below.      Pain Clinic New Patient Consult Note:    Referring Provider: Ozzie   Primary care provider: Alex Horne.    Mino Kulkarni is a 58 year old y.o. old male who presents to the pain clinic with chronic right leg pain    HPI:  Patient Supplied Answers To the  Pain Questionnaire      8/13/2023    10:55 AM    Pain -  Patient Entered Questionnaire/Answers   What number best describes your pain right now:  0 = No pain  to  10 = Worst pain imaginable 6   How would you describe the pain burning    sharp    numbness    throbbing   Which of the following worsen your pain standing    sitting    walking   Which of the following improve or reduce your pain lying down    sitting    medication   What number best describes your average pain for the past week:  0 = No pain  to  10 = Worst pain imaginable 5   What number best describes your LOWEST pain in past 24 hours:  0 = No pain  to  10 = Worst pain imaginable 2   What number best describes your WORST pain in past 24 hours:  0 = No pain  to  10 = Worst pain imaginable 8   When is your pain worst AM    PM   What non-medicine treatments have you already had for your pain physical therapy       Mino is a pleasant 58 year old male with h/o disectomy in early 2000 which was complicated by right calf area muscle atrophy.   Pain in the lower back with occasional tingling in the left posterior thigh. He has consistent pain in the left calf.   Better with sitting down some times. Sometimes he has lay down on the floor.   He is a , the pain affects his ability to work. He was taking calls from the floor due to severe pain. 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   He went through medrol dose pack twice. He felt much better after the steroid, pain was tolerable, but did not lead to complete resolution. He was able to stand the whole night at concert while on oral steroids. Stopping the steroid caused pain to return.     Pain treatments:    Herbal medicines: cbd gummies helps for several hours.   Physical therapy: he is active with biking and running, weight lifting, full body exercise. He is at Ortho rehab specialist for  [place]  Chiropractor: he tried a chiropractor at [facility] once. Somewhat helpful  Pain physician: evaluated for neck pain at [facility] by Dr. Stephens.   At the time, pain in the lower back was manageable. He was able to run a 1/2 marathon.   Surgery: L5-S1 disectomy  2019 he had epidural steroid injection at [facility] spine [place] details of level and approach are not available. The patient states that the injection was not helpful  Biofeedback: none  Acupuncture: none    Tests/Imaging reviewed with the patient:    MRI Lumbar spine 7/14/2023  T12-L1: No spinal canal or neuroforaminal stenosis.     L1-2: No spinal canal or neuroforaminal stenosis.     L2-3: Disc bulge, ligamentum flavum hypertrophy and facet arthropathy.  Mild bilateral neural foraminal stenosis. No spinal canal stenosis     L3-4: Disc bulge and facet arthropathy. There is mild left neural  foraminal stenosis. No spinal canal or right neural foraminal  stenosis.     L4-5: Disc bulge and facet arthropathy. There may be bilateral  superimposed subarticular disc extrusion with effacement of the  lateral recesses and potential impingement of bilateral traversing L5  nerves. Mild bilateral neural foraminal stenosis. No significant  spinal canal stenosis     L5-S1: Disc bulge with superimposed right subarticular extrusion.  There is right lateral recess narrowing, impingement of right  descending S1 and associated thickening of the nerve. There  is  moderate bilateral neural foraminal stenosis.  Trace opposing endplate  edema about the anterior aspect.       Significant Medical History:   No past medical history on file.       Past Surgical History:  No past surgical history on file.       Family History:  No family history on file.       Social History:  Social History     Socioeconomic History    Marital status:      Spouse name: Not on file    Number of children: Not on file    Years of education: Not on file    Highest education level: Not on file   Occupational History    Not on file   Tobacco Use    Smoking status: Never    Smokeless tobacco: Never   Vaping Use    Vaping Use: Never used   Substance and Sexual Activity    Alcohol use: Not on file    Drug use: Not on file    Sexual activity: Not on file   Other Topics Concern    Not on file   Social History Narrative    Not on file     Social Determinants of Health     Financial Resource Strain: Not on file   Food Insecurity: Not on file   Transportation Needs: Not on file   Physical Activity: Not on file   Stress: Not on file   Social Connections: Not on file   Intimate Partner Violence: Not on file   Housing Stability: Not on file     Social History     Social History Narrative    Not on file          Allergies:  Allergies   Allergen Reactions    Animal Dander Itching     Cats       Current Medications:   Current Outpatient Medications   Medication Sig Dispense Refill    colesevelam (WELCHOL) 625 MG tablet       valACYclovir (VALTREX) 500 MG tablet Take 1 tablet (500 mg) by mouth 2 times daily 6 tablet 4    budesonide (ENTOCORT EC) 3 MG EC capsule Take 3 tabs/day (9mg) daily for 6 weeks. Then 2 tabs/day (6mg) for 2 weeks. Then 1 tab/day (3 mg) 2 weeks. (Patient not taking: Reported on 7/10/2023)      methylPREDNISolone (MEDROL DOSEPAK) 4 MG tablet therapy pack Follow Package Directions 21 tablet 0          Current Pain Medications:  Medications related to Pain Management (From now, onward)       None               Blood thinner:    none    Work History:    Current work status: family     Psychosocial History:     History of treatment for behavioral disorder: none  History of suicidal ideation or suicidal attempt: none    Review of Systems:  Review of Systems   Constitutional:  Negative for chills, fever and weight loss.   Musculoskeletal:  Positive for back pain. Negative for myalgias and neck pain.   Skin:  Positive for itching and rash.   Neurological:  Positive for tingling and weakness. Negative for dizziness, tremors, speech change, seizures, loss of consciousness and headaches.   Endo/Heme/Allergies:  Negative for polydipsia.   Psychiatric/Behavioral:  Negative for hallucinations and memory loss.    All other systems reviewed and are negative.    Physical Exam:     Vitals:    08/14/23 1011   BP: 126/86   BP Location: Right arm   Patient Position: Chair   Cuff Size: Adult Large   Pulse: 69   SpO2: 97%   Weight: 82.6 kg (182 lb)   Height: 1.829 m (6')       General Appearance: No distress, seated comfortably  Mood: Euthymic  HE ENT: Non constricted pupils  Respiratory: Non labored breathing  No cervical LN palpable  Skin: No rashes over exposed skin  MS: right calf atrophy, strength bilateral 5/5  Neuro: intact to light touch bilaterally  Gait: non antalgic, ambulates with  out assistance  Able to heal and toe walk    Pain specific exam:    SLR +    Laboratory results:  Recent Labs   Lab Test 08/17/22  0953      POTASSIUM 4.0   CHLORIDE 101   CO2 26   ANIONGAP 11   GLC 93   BUN 19.5   CR 1.10   ANTONIO 9.9       CBC RESULTS: No results for input(s): WBC, RBC, HGB, HCT, MCV, MCH, MCHC, RDW, PLT in the last 84784 hours.      Imaging:       ASSESSMENT AND PLAN:     Encounter Diagnosis:    Lumbo-sacral radiculopathy right L5, S1  Lumbar disc degeneration  Neuropathic pain  Right calf atrophy    Mino Kulkarni is a 58 year old y.o. old male who presents to the pain clinic with right low back  pain with radiation in the right S1 dermatome    I have summarized the patient s past medical history, discussed their clinical findings and the potential differential diagnosis with the patient. Significant past medical history pertinent to the patient s current condition includes L5-S1 disectomy, pain in the S1 dermatome.  The clinical findings reveal SLR positive. Failure of neural-glide exercises.  The differential diagnosis discussed with the patient are listed above. I have discussed anatomy and possible sources of the pain using models and/or pictures (diagrams). I have discussed multi- disciplinary pain management options withthe patient as pertaining to their case as detailed above. The pain management options we discussed included, but were not limited to the recommendations below.  I also discussed with patient the risks, benefits and alternatives to each pain management option.  All of the patient s questions and concerns were answered to the best of my ability.    RECOMMENDATIONS:     1. Medications: No changes. This is a med spine appointment. He can consider neuropathic agents with pcp.     2. Procedure: We are scheduling the patient for L5-S1 epidural steroid injection with fluoroscopy in the procedure suite. Risks/benefits/alternatives were discussed.     I also discussed with the patient that the possible risks involved with interventional treatment included, but are not limited to, no pain control, worsened pain, stroke,seizure, spinal headache, allergic reactions, introduction of infection or bleeding which may lead to emergent spine surgery, nerve damage, paralysis oreven death.  May consider TFESI in the future.     3. Physical therapy: Continue PT exercises.     4. EMG testing and neurosurgery referral can be considered if there is a lack of response to KADI.     Follow up: 4 weeks after LESI.      Answers submitted by the patient for this visit:  Symptoms you have experienced in the last 30  days (Submitted on 8/13/2023)  General Symptoms: Yes  Skin Symptoms: Yes  HENT Symptoms: No  EYE SYMPTOMS: No  HEART SYMPTOMS: No  LUNG SYMPTOMS: No  INTESTINAL SYMPTOMS: No  URINARY SYMPTOMS: No  REPRODUCTIVE SYMPTOMS: No  SKELETAL SYMPTOMS: Yes  BLOOD SYMPTOMS: No  NERVOUS SYSTEM SYMPTOMS: Yes  MENTAL HEALTH SYMPTOMS: No  Please answer the questions below to tell us what conditions you are experiencing: (Submitted on 8/13/2023)  Loss of appetite: No  Weight gain: No  Fatigue: No  Night sweats: No  Increased stress: Yes  Excessive hunger: No  Feeling hot or cold when others believe the temperature is normal: No  Loss of height: No  Post-operative complications: No  Surgical site pain: No  Change in or Loss of Energy: No  Hyperactivity: No  Confusion: No   (Submitted on 8/13/2023)  Changes in hair: No  Changes in moles/birth marks: No  Changes in nails: No  Acne: No  Change in facial hair: No  Warts: No  Non-healing sores: No  Scarring: No  Flaking of skin: Yes  Color changes of hands/feet in cold : No  Sun sensitivity: No  Skin thickening: No  Please answer the questions below to tell us what condition you are experiencing: (Submitted on 8/13/2023)  Swollen joints: No  Joint pain: No  Bone pain: No  Muscle cramps: No  Muscle weakness: Yes  Joint stiffness: Yes  Bone fracture: No  Please answer the questions below to tell us what condition you are experiencing: (Submitted on 8/13/2023)  Trouble with coordination: No  Difficulty walking: Yes  Paralysis: No  Numbness: Yes      Again, thank you for allowing me to participate in the care of your patient.      Sincerely,    Karen Montoya MD

## 2023-08-14 NOTE — PROGRESS NOTES
Pain Clinic New Patient Consult Note:    Referring Provider: Ozzie   Primary care provider: Alex Horne.    Mino Kulkarni is a 58 year old y.o. old male who presents to the pain clinic with chronic right leg pain    HPI:  Patient Supplied Answers To the  Pain Questionnaire      8/13/2023    10:55 AM    Pain -  Patient Entered Questionnaire/Answers   What number best describes your pain right now:  0 = No pain  to  10 = Worst pain imaginable 6   How would you describe the pain burning    sharp    numbness    throbbing   Which of the following worsen your pain standing    sitting    walking   Which of the following improve or reduce your pain lying down    sitting    medication   What number best describes your average pain for the past week:  0 = No pain  to  10 = Worst pain imaginable 5   What number best describes your LOWEST pain in past 24 hours:  0 = No pain  to  10 = Worst pain imaginable 2   What number best describes your WORST pain in past 24 hours:  0 = No pain  to  10 = Worst pain imaginable 8   When is your pain worst AM    PM   What non-medicine treatments have you already had for your pain physical therapy       Mino is a pleasant 58 year old male with h/o disectomy in early 2000 which was complicated by right calf area muscle atrophy.   Pain in the lower back with occasional tingling in the left posterior thigh. He has consistent pain in the left calf.   Better with sitting down some times. Sometimes he has lay down on the floor.   He is a , the pain affects his ability to work. He was taking calls from the floor due to severe pain. 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   He went through medrol dose pack twice. He felt much better after the steroid, pain was tolerable, but did not lead to complete resolution. He was able to stand the whole night at concert while on oral steroids. Stopping the steroid caused pain to return.     Pain treatments:    Herbal medicines:  cbd gummies helps for several hours.   Physical therapy: he is active with biking and running, weight lifting, full body exercise. He is at [facility]  Chiropractor: he tried a chiropractor at [facility] once. Somewhat helpful  Pain physician: evaluated for neck pain at [facility] by Dr. Stephens.   At the time, pain in the lower back was manageable. He was able to run a 1/2 marathon.   Surgery: L5-S1 disectomy  2019 he had epidural steroid injection at [facility] spine [place] details of level and approach are not available. The patient states that the injection was not helpful  Biofeedback: none  Acupuncture: none    Tests/Imaging reviewed with the patient:    MRI Lumbar spine 7/14/2023  T12-L1: No spinal canal or neuroforaminal stenosis.     L1-2: No spinal canal or neuroforaminal stenosis.     L2-3: Disc bulge, ligamentum flavum hypertrophy and facet arthropathy.  Mild bilateral neural foraminal stenosis. No spinal canal stenosis     L3-4: Disc bulge and facet arthropathy. There is mild left neural  foraminal stenosis. No spinal canal or right neural foraminal  stenosis.     L4-5: Disc bulge and facet arthropathy. There may be bilateral  superimposed subarticular disc extrusion with effacement of the  lateral recesses and potential impingement of bilateral traversing L5  nerves. Mild bilateral neural foraminal stenosis. No significant  spinal canal stenosis     L5-S1: Disc bulge with superimposed right subarticular extrusion.  There is right lateral recess narrowing, impingement of right  descending S1 and associated thickening of the nerve. There is  moderate bilateral neural foraminal stenosis.  Trace opposing endplate  edema about the anterior aspect.       Significant Medical History:   No past medical history on file.       Past Surgical History:  No past surgical history on file.       Family History:  No family history on file.       Social History:  Social History      Socioeconomic History    Marital status:      Spouse name: Not on file    Number of children: Not on file    Years of education: Not on file    Highest education level: Not on file   Occupational History    Not on file   Tobacco Use    Smoking status: Never    Smokeless tobacco: Never   Vaping Use    Vaping Use: Never used   Substance and Sexual Activity    Alcohol use: Not on file    Drug use: Not on file    Sexual activity: Not on file   Other Topics Concern    Not on file   Social History Narrative    Not on file     Social Determinants of Health     Financial Resource Strain: Not on file   Food Insecurity: Not on file   Transportation Needs: Not on file   Physical Activity: Not on file   Stress: Not on file   Social Connections: Not on file   Intimate Partner Violence: Not on file   Housing Stability: Not on file     Social History     Social History Narrative    Not on file          Allergies:  Allergies   Allergen Reactions    Animal Dander Itching     Cats       Current Medications:   Current Outpatient Medications   Medication Sig Dispense Refill    colesevelam (WELCHOL) 625 MG tablet       valACYclovir (VALTREX) 500 MG tablet Take 1 tablet (500 mg) by mouth 2 times daily 6 tablet 4    budesonide (ENTOCORT EC) 3 MG EC capsule Take 3 tabs/day (9mg) daily for 6 weeks. Then 2 tabs/day (6mg) for 2 weeks. Then 1 tab/day (3 mg) 2 weeks. (Patient not taking: Reported on 7/10/2023)      methylPREDNISolone (MEDROL DOSEPAK) 4 MG tablet therapy pack Follow Package Directions 21 tablet 0          Current Pain Medications:  Medications related to Pain Management (From now, onward)      None               Blood thinner:    none    Work History:    Current work status: family     Psychosocial History:     History of treatment for behavioral disorder: none  History of suicidal ideation or suicidal attempt: none    Review of Systems:  Review of Systems   Constitutional:  Negative for chills, fever and  weight loss.   Musculoskeletal:  Positive for back pain. Negative for myalgias and neck pain.   Skin:  Positive for itching and rash.   Neurological:  Positive for tingling and weakness. Negative for dizziness, tremors, speech change, seizures, loss of consciousness and headaches.   Endo/Heme/Allergies:  Negative for polydipsia.   Psychiatric/Behavioral:  Negative for hallucinations and memory loss.    All other systems reviewed and are negative.    Physical Exam:     Vitals:    08/14/23 1011   BP: 126/86   BP Location: Right arm   Patient Position: Chair   Cuff Size: Adult Large   Pulse: 69   SpO2: 97%   Weight: 82.6 kg (182 lb)   Height: 1.829 m (6')       General Appearance: No distress, seated comfortably  Mood: Euthymic  HE ENT: Non constricted pupils  Respiratory: Non labored breathing  No cervical LN palpable  Skin: No rashes over exposed skin  MS: right calf atrophy, strength bilateral 5/5  Neuro: intact to light touch bilaterally  Gait: non antalgic, ambulates with  out assistance  Able to heal and toe walk    Pain specific exam:    SLR +    Laboratory results:  Recent Labs   Lab Test 08/17/22  0953      POTASSIUM 4.0   CHLORIDE 101   CO2 26   ANIONGAP 11   GLC 93   BUN 19.5   CR 1.10   ANTONIO 9.9       CBC RESULTS: No results for input(s): WBC, RBC, HGB, HCT, MCV, MCH, MCHC, RDW, PLT in the last 29767 hours.      Imaging:       ASSESSMENT AND PLAN:     Encounter Diagnosis:    Lumbo-sacral radiculopathy right L5, S1  Lumbar disc degeneration  Neuropathic pain  Right calf atrophy    Mino Kulkarni is a 58 year old y.o. old male who presents to the pain clinic with right low back pain with radiation in the right S1 dermatome    I have summarized the patient s past medical history, discussed their clinical findings and the potential differential diagnosis with the patient. Significant past medical history pertinent to the patient s current condition includes L5-S1 disectomy, pain in the S1 dermatome.  The  clinical findings reveal SLR positive. Failure of neural-glide exercises.  The differential diagnosis discussed with the patient are listed above. I have discussed anatomy and possible sources of the pain using models and/or pictures (diagrams). I have discussed multi- disciplinary pain management options withthe patient as pertaining to their case as detailed above. The pain management options we discussed included, but were not limited to the recommendations below.  I also discussed with patient the risks, benefits and alternatives to each pain management option.  All of the patient s questions and concerns were answered to the best of my ability.    RECOMMENDATIONS:     1. Medications: No changes. This is a med spine appointment. He can consider neuropathic agents with pcp.     2. Procedure: We are scheduling the patient for L5-S1 epidural steroid injection with fluoroscopy in the procedure suite. Risks/benefits/alternatives were discussed.     I also discussed with the patient that the possible risks involved with interventional treatment included, but are not limited to, no pain control, worsened pain, stroke,seizure, spinal headache, allergic reactions, introduction of infection or bleeding which may lead to emergent spine surgery, nerve damage, paralysis oreven death.  May consider TFESI in the future.     3. Physical therapy: Continue PT exercises.     4. EMG testing and neurosurgery referral can be considered if there is a lack of response to KAID.     Follow up: 4 weeks after LESI.                                                                                            Answers submitted by the patient for this visit:  Symptoms you have experienced in the last 30 days (Submitted on 8/13/2023)  General Symptoms: Yes  Skin Symptoms: Yes  HENT Symptoms: No  EYE SYMPTOMS: No  HEART SYMPTOMS: No  LUNG SYMPTOMS: No  INTESTINAL SYMPTOMS: No  URINARY SYMPTOMS: No  REPRODUCTIVE SYMPTOMS: No  SKELETAL SYMPTOMS:  Yes  BLOOD SYMPTOMS: No  NERVOUS SYSTEM SYMPTOMS: Yes  MENTAL HEALTH SYMPTOMS: No  Please answer the questions below to tell us what conditions you are experiencing: (Submitted on 8/13/2023)  Loss of appetite: No  Weight gain: No  Fatigue: No  Night sweats: No  Increased stress: Yes  Excessive hunger: No  Feeling hot or cold when others believe the temperature is normal: No  Loss of height: No  Post-operative complications: No  Surgical site pain: No  Change in or Loss of Energy: No  Hyperactivity: No  Confusion: No   (Submitted on 8/13/2023)  Changes in hair: No  Changes in moles/birth marks: No  Changes in nails: No  Acne: No  Change in facial hair: No  Warts: No  Non-healing sores: No  Scarring: No  Flaking of skin: Yes  Color changes of hands/feet in cold : No  Sun sensitivity: No  Skin thickening: No  Please answer the questions below to tell us what condition you are experiencing: (Submitted on 8/13/2023)  Swollen joints: No  Joint pain: No  Bone pain: No  Muscle cramps: No  Muscle weakness: Yes  Joint stiffness: Yes  Bone fracture: No  Please answer the questions below to tell us what condition you are experiencing: (Submitted on 8/13/2023)  Trouble with coordination: No  Difficulty walking: Yes  Paralysis: No  Numbness: Yes

## 2023-08-15 RX ORDER — DULOXETIN HYDROCHLORIDE 30 MG/1
CAPSULE, DELAYED RELEASE ORAL
Qty: 53 CAPSULE | Refills: 0 | Status: SHIPPED | OUTPATIENT
Start: 2023-08-15 | End: 2023-09-14

## 2023-08-15 NOTE — TELEPHONE ENCOUNTER
[facility]. Will monitor for medication safety and efficacy.     Diagnoses and all orders for this visit:    Right-sided low back pain with right-sided sciatica, unspecified chronicity  -     DULoxetine (CYMBALTA) 30 MG capsule; Take 1 capsule (30 mg) by mouth daily for 7 days, THEN 2 capsules (60 mg) daily for 23 days.    Hip pain, right  -     DULoxetine (CYMBALTA) 30 MG capsule; Take 1 capsule (30 mg) by mouth daily for 7 days, THEN 2 capsules (60 mg) daily for 23 days.      Alex Horne MD  5:43 PM, August 15, 2023

## 2023-08-16 ENCOUNTER — OFFICE VISIT (OUTPATIENT)
Dept: FAMILY MEDICINE | Facility: CLINIC | Age: 58
End: 2023-08-16
Payer: COMMERCIAL

## 2023-08-16 VITALS
OXYGEN SATURATION: 97 % | WEIGHT: 182 LBS | HEIGHT: 72 IN | BODY MASS INDEX: 24.65 KG/M2 | TEMPERATURE: 97.9 F | DIASTOLIC BLOOD PRESSURE: 77 MMHG | HEART RATE: 75 BPM | RESPIRATION RATE: 15 BRPM | SYSTOLIC BLOOD PRESSURE: 116 MMHG

## 2023-08-16 DIAGNOSIS — M54.41 RIGHT-SIDED LOW BACK PAIN WITH RIGHT-SIDED SCIATICA, UNSPECIFIED CHRONICITY: ICD-10-CM

## 2023-08-16 DIAGNOSIS — Z01.818 PREOP GENERAL PHYSICAL EXAM: Primary | ICD-10-CM

## 2023-08-16 ASSESSMENT — ANXIETY QUESTIONNAIRES
GAD7 TOTAL SCORE: 0
5. BEING SO RESTLESS THAT IT IS HARD TO SIT STILL: NOT AT ALL
IF YOU CHECKED OFF ANY PROBLEMS ON THIS QUESTIONNAIRE, HOW DIFFICULT HAVE THESE PROBLEMS MADE IT FOR YOU TO DO YOUR WORK, TAKE CARE OF THINGS AT HOME, OR GET ALONG WITH OTHER PEOPLE: NOT DIFFICULT AT ALL
3. WORRYING TOO MUCH ABOUT DIFFERENT THINGS: NOT AT ALL
1. FEELING NERVOUS, ANXIOUS, OR ON EDGE: NOT AT ALL
2. NOT BEING ABLE TO STOP OR CONTROL WORRYING: NOT AT ALL
GAD7 TOTAL SCORE: 0
6. BECOMING EASILY ANNOYED OR IRRITABLE: NOT AT ALL
7. FEELING AFRAID AS IF SOMETHING AWFUL MIGHT HAPPEN: NOT AT ALL

## 2023-08-16 ASSESSMENT — PATIENT HEALTH QUESTIONNAIRE - PHQ9
SUM OF ALL RESPONSES TO PHQ QUESTIONS 1-9: 2
5. POOR APPETITE OR OVEREATING: NOT AT ALL

## 2023-08-16 NOTE — NURSING NOTE
58 year old  Chief Complaint   Patient presents with    Pre-Op Exam     LUMBAR Surgery: right L5-S1 extended lumbar hemilaminectomy and microdiscectomy       Blood pressure 116/77, pulse 75, temperature 97.9  F (36.6  C), temperature source Skin, resp. rate 15, height 1.829 m (6'), weight 82.6 kg (182 lb), SpO2 97 %. Body mass index is 24.68 kg/m .  Patient Active Problem List   Diagnosis    Actinic keratosis    Adenomatous polyp of colon    Collagenous colitis    History of basal cell carcinoma (BCC)    Hyperlipidemia    Impingement syndrome of shoulder region    Migraine variant    Neck pain    Shoulder pain    Osteoarthritis of knee    Tear of medial meniscus of knee       Wt Readings from Last 2 Encounters:   08/16/23 82.6 kg (182 lb)   08/14/23 82.6 kg (182 lb)     BP Readings from Last 3 Encounters:   08/16/23 116/77   08/14/23 126/86   07/10/23 137/87         Current Outpatient Medications   Medication    colesevelam (WELCHOL) 625 MG tablet    DULoxetine (CYMBALTA) 30 MG capsule    budesonide (ENTOCORT EC) 3 MG EC capsule    methylPREDNISolone (MEDROL DOSEPAK) 4 MG tablet therapy pack    valACYclovir (VALTREX) 500 MG tablet     No current facility-administered medications for this visit.       Social History     Tobacco Use    Smoking status: Never    Smokeless tobacco: Never   Vaping Use    Vaping Use: Never used       Health Maintenance Due   Topic Date Due    YEARLY PREVENTIVE VISIT  Never done    ADVANCE CARE PLANNING  Never done    HEPATITIS B IMMUNIZATION (1 of 3 - 3-dose series) Never done    HIV SCREENING  Never done    HEPATITIS C SCREENING  Never done    LIPID  Never done       No results found for: PAP      August 16, 2023 3:33 PM

## 2023-08-16 NOTE — PROGRESS NOTES
"[facility]  [address], [address]  [place] [facility] [address]  Phone: [phone]  Fax: [phone]  Primary Provider: Alex Horne  Pre-op Performing Provider: PARVEEN CARCAMO      PREOPERATIVE EVALUATION:  Today's date: 8/16/2023    Mino Kulkarni is a 58 year old male who presents for a preoperative evaluation.    Surgical Information:  Surgery/Procedure: Right L5-S1 extended lumbar hemilaminectomy and microdiscectomy   Surgery Location: [facility]  Surgeon: Justice Hall   Surgery Date: 08/21/2023  Time of Surgery: 7:30am  Where patient plans to recover: At home with family  Fax number for surgical facility: [phone]      Subjective     HPI related to upcoming procedure: Off and on back pain for 4 years. Had in 2019 and then again starting in April 2023. Now, starting to have weakness.     Alex has a known cardiac murmur for \"many years.\" Has had Echocardiograms that were normal.   Also, has Executive health physicals at [facility] with ECGs that have been normal.         8/15/2023     1:07 PM   Preop Questions   1. Have you ever had a heart attack or stroke? No   2. Have you ever had surgery on your heart or blood vessels, such as a stent placement, a coronary artery bypass, or surgery on an artery in your head, neck, heart, or legs? No   3. Do you have chest pain with activity? No   4. Do you have a history of  heart failure? No   5. Do you currently have a cold, bronchitis or symptoms of other infection? No   6. Do you have a cough, shortness of breath, or wheezing? No   7. Do you or anyone in your family have previous history of blood clots? No   8. Do you or does anyone in your family have a serious bleeding problem such as prolonged bleeding following surgeries or cuts? No   9. Have you ever had problems with anemia or been told to take iron pills? No   10. Have you had any abnormal blood loss such as black, tarry or bloody stools? No "   11. Have you ever had a blood transfusion? No   12. Are you willing to have a blood transfusion if it is medically needed before, during, or after your surgery? Yes   13. Have you or any of your relatives ever had problems with anesthesia? No   14. Do you have sleep apnea, excessive snoring or daytime drowsiness? No   15. Do you have any artifical heart valves or other implanted medical devices like a pacemaker, defibrillator, or continuous glucose monitor? No   16. Do you have artificial joints? No   17. Are you allergic to latex? No       Health Care Directive:  Patient does not have a Health Care Directive or Living Will: Patient states has Advance Directive and will bring in a copy to clinic.    Preoperative Review of :   reviewed - controlled substances prescribed by other outside provider(s).  Received Tramadol for oral procedure in June 2023      Status of Chronic Conditions:  See problem list for active medical problems.  Problems all longstanding and stable, except as noted/documented.  See ROS for pertinent symptoms related to these conditions.    Review of Systems  Constitutional, neuro, ENT, endocrine, pulmonary, cardiac, gastrointestinal, genitourinary, musculoskeletal, integument and psychiatric systems are negative, except as otherwise noted.    Patient Active Problem List    Diagnosis Date Noted    Osteoarthritis of knee 11/24/2021     Priority: Medium    Tear of medial meniscus of knee 11/24/2021     Priority: Medium    Impingement syndrome of shoulder region 08/26/2021     Priority: Medium    Neck pain 08/26/2021     Priority: Medium    Shoulder pain 08/26/2021     Priority: Medium    Actinic keratosis 04/19/2021     Priority: Medium    Adenomatous polyp of colon 04/19/2021     Priority: Medium    History of basal cell carcinoma (BCC) 04/19/2021     Priority: Medium    Hyperlipidemia 04/19/2021     Priority: Medium    Collagenous colitis 05/09/2011     Priority: Medium     Formatting of  this note might be different from the original.  5/2011. Responded quickly to [facility]  Budesonide recommended for future episodes. Dr Cobos, 8/2012      Migraine variant 05/21/2008     Priority: Medium     Formatting of this note might be different from the original.  Normal MR/MRA 5/12 after visual field cut.        No past medical history on file.  No past surgical history on file.  Current Outpatient Medications   Medication Sig Dispense Refill    colesevelam (WELCHOL) 625 MG tablet       DULoxetine (CYMBALTA) 30 MG capsule Take 1 capsule (30 mg) by mouth daily for 7 days, THEN 2 capsules (60 mg) daily for 23 days. 53 capsule 0       Allergies   Allergen Reactions    Animal Dander Itching     Cats        Social History     Tobacco Use    Smoking status: Never    Smokeless tobacco: Never   Substance Use Topics    Alcohol use: Not on file       Objective     /77 (BP Location: Right arm, Patient Position: Sitting, Cuff Size: Adult Regular)   Pulse 75   Temp 97.9  F (36.6  C) (Skin)   Resp 15   Ht 1.829 m (6')   Wt 82.6 kg (182 lb)   SpO2 97%   BMI 24.68 kg/m      Physical Exam    GENERAL APPEARANCE: healthy, alert and no distress     EYES: EOMI,  PERRL     HENT: ear canals and TM's normal and nose and mouth without ulcers or lesions     NECK: no adenopathy, no asymmetry, masses, or scars and thyroid normal to palpation     RESP: lungs clear to auscultation - no rales, rhonchi or wheezes     CV: regular rates and rhythm, normal S1 S2. Has a murmur, systolic ejection at RIGHT sternal border. This has been constant for years and is louder when he's lying than when he's seated. No click or rub     ABDOMEN:  soft, nontender, no HSM or masses and bowel sounds normal     SKIN: no suspicious lesions or rashes  Has RIGHT low back pain with weakness in the RIGHT calf. Can do active straight leg raise.      NEURO: Normal strength and tone, sensory exam grossly normal, mentation intact and speech normal      PSYCH: mentation appears normal. and affect normal/bright     LYMPHATICS: No cervical adenopathy    Recent Labs   Lab Test 08/17/22  0953      POTASSIUM 4.0   CR 1.10        Diagnostics:    Had Echo in April 2023 for evaluation of the cardiac murmur.   Interpretation as follows:   Final Impressions   1. Normal left ventricular chamber size, no regional wall motion abnormalities, calculated 2-D biplane volumetric ejection fraction of 60%.   2. Normal left ventricular geometry, normal diastolic function.   3. Normal right ventricular chamber size, normal systolic function, estimated right ventricular systolic pressure 26 mmHg (right atrial pressure of 5 mmHg).   4. No hemodynamically significant valvular heart disease.   5. No  pericardial effusion.     Also, had an ECG in April 2023 at [facility] with interpretation of normal sinus rhythm    Revised Cardiac Risk Index (RCRI):  The patient has the following serious cardiovascular risks for perioperative complications:   - No serious cardiac risks = 0 points     RCRI Interpretation: 0 points: Class I (very low risk - 0.4% complication rate)    JAMIE Herzog is a 59 yo with sciatica in need of surgery. He has a cardiac murmur that has been present for the past several decades. He's had Echocardiograms and EKGs with evaluation from Cardiologists at [facility] without concern.     P/  Mino is stable to proceed with lumbar hemilaminectomy and [facility]discectomy at [facility].     Form will be printed, given to Mino and faxed       Signed Electronically by: Ender Andrade MD  Copy of this evaluation report is provided to requesting physician.

## 2023-12-20 ENCOUNTER — TELEPHONE (OUTPATIENT)
Dept: FAMILY MEDICINE | Facility: CLINIC | Age: 58
End: 2023-12-20

## 2023-12-20 DIAGNOSIS — B00.9 HSV (HERPES SIMPLEX VIRUS) INFECTION: ICD-10-CM

## 2023-12-20 RX ORDER — VALACYCLOVIR HYDROCHLORIDE 500 MG/1
TABLET, FILM COATED ORAL
Qty: 6 TABLET | Refills: 4 | Status: SHIPPED | OUTPATIENT
Start: 2023-12-20

## 2023-12-20 NOTE — TELEPHONE ENCOUNTER
Medication requested: valACYclovir (VALTREX) 500 MG tablet   Last office visit: 8/16/23  [facility] appointments: none  Medication last refilled: 4/4/23; 6 + 4 refills  Last qualifying labs:   Component      Latest Ref Rng 8/17/2022  9:53 AM   Creatinine      0.67 - 1.17 mg/dL 1.10      Prescription approved per [facility] Refill Protocol.    Tobin VELASQUEZ, RN  12/20/23 2:55 PM

## 2024-02-23 ENCOUNTER — NURSE TRIAGE (OUTPATIENT)
Dept: NURSING | Facility: CLINIC | Age: 59
End: 2024-02-23
Payer: COMMERCIAL

## 2024-02-23 NOTE — TELEPHONE ENCOUNTER
Nurse Triage SBAR    Is this a 2nd Level Triage? NO    Situation:   Patient is calling to report left eye symptoms.    Background:   He was cleaning up in the kitchen when he felt like he got something in his eye.    Assessment:   He woke up this morning with the feeling still there.  It's still painful and he has blurry vision in his left eye.  He has tried to irrigate and it has given him no relief.    Protocol Recommended Disposition:   Go to ED Now    Recommendation:   Advised pt to be seen in the ED.  Care advice given.  Pt verbalized understanding.    Nelda Conway, RN, BSN Nurse Triage Advisor 2/23/2024 5:56 AM        Reason for Disposition   [1] Eye has been washed out > 30 minutes ago AND [2] feels like FB is still present    Additional Information   Negative: Foreign body (FB) stuck on eyeball  (Exception: Contact lens.)   Negative: [1] Sharp FB (even if FB was removed) AND [2] any pain present now   Negative: FB hit eye at high speed (e.g., small metallic chip from hammering, lawnmower, BB gun, explosion)    Protocols used: Eye - Foreign Body-A-

## 2024-06-02 ENCOUNTER — HEALTH MAINTENANCE LETTER (OUTPATIENT)
Age: 59
End: 2024-06-02

## 2025-06-15 ENCOUNTER — HEALTH MAINTENANCE LETTER (OUTPATIENT)
Age: 60
End: 2025-06-15